# Patient Record
Sex: MALE | Race: WHITE
[De-identification: names, ages, dates, MRNs, and addresses within clinical notes are randomized per-mention and may not be internally consistent; named-entity substitution may affect disease eponyms.]

---

## 2019-08-18 ENCOUNTER — HOSPITAL ENCOUNTER (EMERGENCY)
Dept: HOSPITAL 89 - ER | Age: 29
Discharge: HOME | End: 2019-08-18
Payer: SELF-PAY

## 2019-08-18 VITALS — DIASTOLIC BLOOD PRESSURE: 86 MMHG | SYSTOLIC BLOOD PRESSURE: 129 MMHG

## 2019-08-18 DIAGNOSIS — R10.31: Primary | ICD-10-CM

## 2019-08-18 LAB — PLATELET COUNT, AUTOMATED: 309 K/UL (ref 150–450)

## 2019-08-18 PROCEDURE — 84460 ALANINE AMINO (ALT) (SGPT): CPT

## 2019-08-18 PROCEDURE — 96374 THER/PROPH/DIAG INJ IV PUSH: CPT

## 2019-08-18 PROCEDURE — 82565 ASSAY OF CREATININE: CPT

## 2019-08-18 PROCEDURE — 74177 CT ABD & PELVIS W/CONTRAST: CPT

## 2019-08-18 PROCEDURE — 84155 ASSAY OF PROTEIN SERUM: CPT

## 2019-08-18 PROCEDURE — 96361 HYDRATE IV INFUSION ADD-ON: CPT

## 2019-08-18 PROCEDURE — 85025 COMPLETE CBC W/AUTO DIFF WBC: CPT

## 2019-08-18 PROCEDURE — 84075 ASSAY ALKALINE PHOSPHATASE: CPT

## 2019-08-18 PROCEDURE — 82310 ASSAY OF CALCIUM: CPT

## 2019-08-18 PROCEDURE — 83690 ASSAY OF LIPASE: CPT

## 2019-08-18 PROCEDURE — 82435 ASSAY OF BLOOD CHLORIDE: CPT

## 2019-08-18 PROCEDURE — 84520 ASSAY OF UREA NITROGEN: CPT

## 2019-08-18 PROCEDURE — 82247 BILIRUBIN TOTAL: CPT

## 2019-08-18 PROCEDURE — 96375 TX/PRO/DX INJ NEW DRUG ADDON: CPT

## 2019-08-18 PROCEDURE — 82374 ASSAY BLOOD CARBON DIOXIDE: CPT

## 2019-08-18 PROCEDURE — 81001 URINALYSIS AUTO W/SCOPE: CPT

## 2019-08-18 PROCEDURE — 82947 ASSAY GLUCOSE BLOOD QUANT: CPT

## 2019-08-18 PROCEDURE — 84132 ASSAY OF SERUM POTASSIUM: CPT

## 2019-08-18 PROCEDURE — 82040 ASSAY OF SERUM ALBUMIN: CPT

## 2019-08-18 PROCEDURE — 99284 EMERGENCY DEPT VISIT MOD MDM: CPT

## 2019-08-18 PROCEDURE — 84295 ASSAY OF SERUM SODIUM: CPT

## 2019-08-18 PROCEDURE — 84450 TRANSFERASE (AST) (SGOT): CPT

## 2019-08-18 NOTE — ER REPORT
History and Physical


Time Seen By MD:  09:00


HPI/ROS


CHIEF COMPLAINT: Abdominal pain





HISTORY OF PRESENT ILLNESS: Patient is a 28-year-old male who presents to the 


emergency department with right lower abdominal pain. Patient has a history of 


bilateral hernia repairs it was done in APPROXIMATELY one year ago. Patient is 


currently in [place] and states he is "living out of his car". He has no primary


care provider here. He anticipates getting a job in saying in [place]. The pain 


is fairly severe at 8 out of 10 in intensity associated with some nausea. He 


denies fevers or chills. Denies change in bowel or bladder function. He reports 


no vomiting. He denies any other symptoms at this time.





REVIEW OF SYSTEMS:


Constitutional: No fever, no chills.


Eyes: No discharge.


ENT: No sore throat. 


Cardiovascular: No chest pain, no palpitations.


Respiratory: No cough, no shortness of breath.


Gastrointestinal: Lower quadrant abdominal pain


Genitourinary: No hematuria. No groin pain


Musculoskeletal: No back pain.


Skin: No rashes.


Neurological: No headache.


Allergies:  


Coded Allergies:  


     acetaminophen (Verified  Allergy, Unknown, "KNOCKED ME OUT FOR 2 DAYS", 


19)


     codeine (Verified  Allergy, Unknown, RASH, 19)


     hydrocodone (Verified  Allergy, Unknown, "KNOCKED ME OUT FOR 2 DAYS", 


19)


Home Meds


Active Scripts


Dicyclomine Hcl (DICYCLOMINE HCL) 20 Mg Tablet, 20 MG PO QID for abdominal pain,


#20 TAB 0 Refills


   Prov:DAVID JACOBSON MD         19


Ondansetron Hcl (ZOFRAN) 4 Mg Tablet, 4 MG PO Q8H for Nausea, #15 TAB 0 Refills


   Prov:DAVID JACOBSON MD         19


Past Medical/Surgical History


History of bilateral inguinal hernia repair


Constitutional





Vital Sign - Last 24 Hours








 19





 09:06 09:30 10:00


 


Temp 98.1  


 


Resp 16  


 


B/P (MAP) 137/93 117/73 (88) 129/86 (100)


 


Pulse Ox 99 99 


 


O2 Delivery Room Air  








Physical Exam


General/Constitutional: Patient is awake, alert, nontoxic and in no acute 


respiratory distress. 


Head: Normocephalic and atraumatic.


Eyes: Conjunctival clear, Pupils are equal and reactive to light. Extraocular 


muscles are intact and symmetrical. Sclera are clear and anicteric.


Ears:External canals are clear. Tympanic membranes are clear with normal 


landmarks and light reflex.


Nares: No rhinorrhea or bleeding. Turbinates are pink and moist.


Oropharyngeal: Mucous membranes are moist. There is no pharyngeal erythema or 


exudate. There are no palatal petechiae. Uvula is midline and symmetrical. 


Neck: Supple, no adenopathy.


Cardiovascular: Heart is regular rate and rhythm without audible murmurs, rubs 


or gallops. 


Pulmonary: Lungs are clear to auscultation bilaterally. There are no wheezes, 


rales, or rhonchi. Chest rise is symmetrical


Abdomen: Bilateral quadrant pain without guarding or peritoneal signs.


 exam: Normal bilateral testicular alignment no obvious scrotal masses.


Extremities: No gross deformities, No peripheral cyanosis. Able to move all 4 


extremities.


Neuro: Alert and oriented X3, 


Skin: No rashes, skin is warm dry and well perfused.





Medical Decision Making


Data Points


Result Diagram:  


19 0903                                                                    


           19 0903





Laboratory





Hematology








Test


 19


09:03


 


White Blood Count


 9.8 k/uL


(4.5-11.0)


 


Red Blood Count


 5.48 M/uL


(4.00-5.60)


 


Hemoglobin


 17.1 g/dL


(14.0-18.0)


 


Hematocrit


 46.3 %


(42.0-52.0)


 


Mean Corpuscular Volume


 84.4 fL


(80.0-96.0)


 


Mean Corpuscular Hemoglobin


 31.2 pg


(26.0-33.0)


 


Mean Corpuscular Hemoglobin


Concent 36.9 g/dL


(32.0-36.0)  H


 


Red Cell Distribution Width


 13.4 %


(11.5-14.5)


 


Platelet Count


 309 K/uL


(150-450)


 


Mean Platelet Volume


 7.3 fL


(7.2-11.1)


 


Neutrophils (%) (Auto)


 65.3 %


(39.4-72.5)


 


Lymphocytes (%) (Auto)


 25.8 %


(17.6-49.6)


 


Monocytes (%) (Auto)


 6.4 %


(4.1-12.4)


 


Eosinophils (%) (Auto)


 1.9 %


(0.4-6.7)


 


Basophils (%) (Auto)


 0.6 %


(0.3-1.4)


 


Nucleated RBC Relative Count


(auto) 0.7 /100WBC  





 


Neutrophils # (Auto)


 6.4 K/uL


(2.0-7.4)


 


Lymphocytes # (Auto)


 2.5 K/uL


(1.3-3.6)


 


Monocytes # (Auto)


 0.6 K/uL


(0.3-1.0)


 


Eosinophils # (Auto)


 0.2 K/uL


(0.0-0.5)


 


Basophils # (Auto)


 0.1 K/uL


(0.0-0.1)


 


Nucleated RBC Absolute Count


(auto) 0.07 K/uL  











Chemistry








Test


 19


09:03


 


Sodium Level


 139 mmol/L


(137-145)


 


Potassium Level


 3.5 mmol/L


(3.5-5.0)


 


Chloride Level


 102 mmol/L


()


 


Carbon Dioxide Level


 27 mmol/L


(22-30)


 


Blood Urea Nitrogen


 12 mg/dl


(9-21)


 


Creatinine


 1.00 mg/dl


(0.66-1.25)


 


Glomerular Filtration Rate


Calc > 60.0 





 


Random Glucose


 119 mg/dl


()


 


Calcium Level


 9.2 mg/dl


(8.4-10.2)


 


Total Bilirubin


 0.5 mg/dl


(0.2-1.3)


 


Aspartate Amino Transf


(AST/SGOT) 22 U/L (0-35) 





 


Alanine Aminotransferase


(ALT/SGPT) 24 U/L (0-56) 





 


Alkaline Phosphatase 40 U/L (0-126) 


 


Total Protein


 7.9 g/dl


(6.3-8.2)


 


Albumin


 4.8 g/dl


(3.5-5.0)


 


Lipase


 69 U/L


()








Urinalysis








Test


 19


09:56


 


Urine Color Yellow 


 


Urine Clarity Clear 


 


Urine pH


 6.0 pH


(4.8-9.5)


 


Urine Specific Gravity 1.012 


 


Urine Protein


 Negative mg/dL


(NEGATIVE)


 


Urine Glucose (UA)


 Negative mg/dL


(NEGATIVE)


 


Urine Ketones


 Negative mg/dL


(NEGATIVE)


 


Urine Blood


 Negative


(NEGATIVE)


 


Urine Nitrite


 Negative


(NEGATIVE)


 


Urine Bilirubin


 Negative


(NEGATIVE)


 


Urine Urobilinogen


 Negative mg/dL


(0.2-1.9)


 


Urine Leukocyte Esterase


 Negative


(NEGATIVE)


 


Urine RBC


 None /HPF


(0-2/HPF)


 


Urine WBC


 1 /HPF


(0-5/HPF)


 


Urine Squamous Epithelial


Cells Few /LPF


(</=FEW)


 


Urine Bacteria


 Negative /HPF


(NONE-FEW)


 


Urine Mucus


 None /HPF


(NONE-FEW)











EKG/Imaging


Imaging


FACILITY: [facility] 


 


PATIENT NAME: Tomy Herrera


: 1990


MR: [id number]


V: [id number]


EXAM DATE: 


ORDERING PHYSICIAN: DAVID JACOBSON


TECHNOLOGIST: 


 


[place] [facility] [place]


Patient: Tomy Herrera


: 1990


MRN: [id number]


Visit/Account:[id number]


Date of Sevice:  2019


 


ACCESSION #: [id number]


 


CT abdomen and pelvis with IV contrast


 


Indication: Right lower quadrant pain


 


Comparison:   None available. .


 


Technique:   Axial CT images were obtained through the abdomen and pelvis during


injection of nonionic iodinated intravenous contrast. Reformatted coronal and 


sagittal images were also obtained. One of the following dose optimization 


techniques was utilized in the performance of this exam: Automated exposure 


control; adjustment of the mA and/or kV according to the patient's size; or use 


of an iterative  reconstruction technique.  Specific details can be referenced 


in the facility's radiology CT exam operational policy.


Contrast:   80 ml of Isovue-370  IV contrast.


 


Findings:


Lower lung fields: Limited views lower lung field are unremarkable.


 


Liver: No focal parenchymal abnormality of the liver.


Biliary: Gallbladder appears unremarkable as well as the intra and extra hepatic


biliary system.


Pancreas: Normal appearance.


Spleen: Normal appearance.


Adrenal glands: Unremarkable.


Kidneys / retroperitoneum: No evidence of nephrolithiasis or hydronephrosis


 


 


Bowel / peritoneum / mesenteries: The small and large intestine are without 


acute pathology. Within the right adnexa, there is no evidence of free fluid or 


fatty stranding. The appendix is visualized, air-filled without adjacent 


periappendiceal stranding in the right lower quadrant.


 


 


Lymph node assessment: No pathologic adenopathy identified.


 


Pelvic  structures: Bladder is distended without wall thickening or adjacent 


perivesical inflammatory change


 


 


Vessels: No significant atherosclerotic calcifications seen throughout a 


nonaneurysmal abdominal aorta and branches.


 


Musculoskeletal / Body wall: No acute or aggressive osseous abnormality.


 


 


 


IMPRESSION:


1. No evidence of acute intra-abdominal or pelvic pathology.


 


 


Report Dictated By: Nathanael Alcocer MD at 2019 11:03 AM


 


Report E-Signed By: Nathanael Alcocer MD  at 2019 11:07 AM


 


WSN:[id number]





ED Course/Re-evaluation


ED Course


 2019 11:17:40 am blood work is reassuring and abdominal CTs as well as to


recognize any acute abdominal pathology. Patient feeling better after IV fluids 


nausea and pain medicine plan at this time will be to discharge home with 


instructions to return if symptoms worsen, we will send prescriptions for Bentyl


and Zofran.


Decision to Disposition Date:  Aug 18, 2019


Decision to Disposition Time:  11:19





Depart


Departure


Latest Vital Signs





Vital Signs








  Date Time  Temp Pulse Resp B/P (MAP) Pulse Ox O2 Delivery O2 Flow Rate FiO2


 


19 10:00    129/86 (100)    


 


19 09:30     99   


 


19 09:06 98.1  16   Room Air  








Impression:  


   Primary Impression:  


   Abdominal pain


Condition:  Improved


Disposition:  HOME OR SELF-CARE


Referrals:  


GELACIO CORTÉS DO


call to schedule a follow up appointment and establish a primary care physician


New Scripts


Dicyclomine Hcl (DICYCLOMINE HCL) 20 Mg Tablet


20 MG PO QID for abdominal pain, #20 TAB 0 Refills


   Prov: DAVID JACOBSON MD         19 


Ondansetron Hcl (ZOFRAN) 4 Mg Tablet


4 MG PO Q8H for Nausea, #15 TAB 0 Refills


   Prov: DAVID JACOBSON MD         19


Patient Instructions:  Abdominal Pain (ED)





Additional Instructions:  


Return to the emergency department if symptoms worsen at any time.





Problem Qualifiers








   Primary Impression:  


   Abdominal pain


   Abdominal location:  right lower quadrant  Qualified Codes:  R10.31 - Right 


   lower quadrant pain








DAVID JACOBSON MD             Aug 18, 2019 09:04

## 2019-08-18 NOTE — RADIOLOGY IMAGING REPORT
FACILITY: [facility] - [place] 

 

PATIENT NAME: Tomy Herrera

: 1990

MR: [id number]

V: [id number]

EXAM DATE: 

ORDERING PHYSICIAN: DAVID JACOBSON

TECHNOLOGIST: 

 

Location: [facility] - [place]

Patient: Tomy Herrera

: 1990

MRN: [id number]

Visit/Account:[id number]

Date of Sevice:  2019

 

ACCESSION #: [id number]

 

CT abdomen and pelvis with IV contrast

 

Indication: Right lower quadrant pain

 

Comparison:   None available. .

 

Technique:   Axial CT images were obtained through the abdomen and pelvis during injection of nonioni
c iodinated intravenous contrast. Reformatted coronal and sagittal images were also obtained. One of 
the following dose optimization techniques was utilized in the performance of this exam: Automated ex
posure control; adjustment of the mA and/or kV according to the patient's size; or use of an iterativ
e  reconstruction technique.  Specific details can be referenced in the facility's radiology CT exam 
operational policy.

Contrast:   80 ml of Isovue-370  IV contrast.

 

Findings:

Lower lung fields: Limited views lower lung field are unremarkable.

 

Liver: No focal parenchymal abnormality of the liver.

Biliary: Gallbladder appears unremarkable as well as the intra and extra hepatic biliary system.

Pancreas: Normal appearance.

Spleen: Normal appearance.

Adrenal glands: Unremarkable.

Kidneys / retroperitoneum: No evidence of nephrolithiasis or hydronephrosis

 

 

Bowel / peritoneum / mesenteries: The small and large intestine are without acute pathology. Within t
he right adnexa, there is no evidence of free fluid or fatty stranding. The appendix is visualized, a
ir-filled without adjacent periappendiceal stranding in the right lower quadrant.

 

 

Lymph node assessment: No pathologic adenopathy identified.

 

Pelvic  structures: Bladder is distended without wall thickening or adjacent perivesical inflammato
ry change

 

 

Vessels: No significant atherosclerotic calcifications seen throughout a nonaneurysmal abdominal aort
a and branches.

 

Musculoskeletal / Body wall: No acute or aggressive osseous abnormality.

 

 

 

IMPRESSION:

1. No evidence of acute intra-abdominal or pelvic pathology.

 

 

Report Dictated By: Nathanael Alcocer MD at 2019 11:03 AM

 

Report E-Signed By: Nathanael Alcocer MD  at 2019 11:07 AM

 

WSN:[id number]